# Patient Record
Sex: FEMALE | Race: WHITE | Employment: UNEMPLOYED | URBAN - NONMETROPOLITAN AREA
[De-identification: names, ages, dates, MRNs, and addresses within clinical notes are randomized per-mention and may not be internally consistent; named-entity substitution may affect disease eponyms.]

---

## 2023-02-01 ENCOUNTER — TELEPHONE (OUTPATIENT)
Dept: FAMILY MEDICINE CLINIC | Age: 48
End: 2023-02-01

## 2023-02-09 ENCOUNTER — OFFICE VISIT (OUTPATIENT)
Dept: FAMILY MEDICINE CLINIC | Age: 48
End: 2023-02-09
Payer: COMMERCIAL

## 2023-02-09 VITALS
BODY MASS INDEX: 27.49 KG/M2 | WEIGHT: 165 LBS | SYSTOLIC BLOOD PRESSURE: 136 MMHG | HEIGHT: 65 IN | RESPIRATION RATE: 20 BRPM | HEART RATE: 86 BPM | DIASTOLIC BLOOD PRESSURE: 84 MMHG

## 2023-02-09 DIAGNOSIS — Z13.220 SCREENING FOR HYPERLIPIDEMIA: ICD-10-CM

## 2023-02-09 DIAGNOSIS — N30.90 CYSTITIS: ICD-10-CM

## 2023-02-09 DIAGNOSIS — Z91.018 FOOD ALLERGY: ICD-10-CM

## 2023-02-09 DIAGNOSIS — L98.9 SKIN ABNORMALITIES: ICD-10-CM

## 2023-02-09 DIAGNOSIS — M54.40 BACK PAIN OF LUMBAR REGION WITH SCIATICA: ICD-10-CM

## 2023-02-09 DIAGNOSIS — F41.9 ANXIETY: Primary | ICD-10-CM

## 2023-02-09 DIAGNOSIS — R35.0 URINARY FREQUENCY: ICD-10-CM

## 2023-02-09 LAB
BILIRUBIN, POC: NEGATIVE
BLOOD URINE, POC: ABNORMAL
CLARITY, POC: CLEAR
COLOR, POC: YELLOW
GLUCOSE URINE, POC: NEGATIVE
KETONES, POC: NEGATIVE
LEUKOCYTE EST, POC: NEGATIVE
NITRITE, POC: NEGATIVE
PH, POC: 5.5
PROTEIN, POC: NEGATIVE
SPECIFIC GRAVITY, POC: 1.01
UROBILINOGEN, POC: 0.2

## 2023-02-09 PROCEDURE — 99204 OFFICE O/P NEW MOD 45 MIN: CPT | Performed by: NURSE PRACTITIONER

## 2023-02-09 PROCEDURE — 81003 URINALYSIS AUTO W/O SCOPE: CPT | Performed by: NURSE PRACTITIONER

## 2023-02-09 RX ORDER — NITROFURANTOIN 25; 75 MG/1; MG/1
100 CAPSULE ORAL 2 TIMES DAILY
Qty: 10 CAPSULE | Refills: 0 | Status: SHIPPED | OUTPATIENT
Start: 2023-02-09 | End: 2023-02-14

## 2023-02-09 RX ORDER — BUPROPION HYDROCHLORIDE 150 MG/1
TABLET ORAL
Qty: 53 TABLET | Refills: 0 | Status: SHIPPED | OUTPATIENT
Start: 2023-02-09 | End: 2023-03-11

## 2023-02-09 SDOH — ECONOMIC STABILITY: FOOD INSECURITY: WITHIN THE PAST 12 MONTHS, THE FOOD YOU BOUGHT JUST DIDN'T LAST AND YOU DIDN'T HAVE MONEY TO GET MORE.: NEVER TRUE

## 2023-02-09 SDOH — ECONOMIC STABILITY: INCOME INSECURITY: HOW HARD IS IT FOR YOU TO PAY FOR THE VERY BASICS LIKE FOOD, HOUSING, MEDICAL CARE, AND HEATING?: NOT HARD AT ALL

## 2023-02-09 SDOH — ECONOMIC STABILITY: FOOD INSECURITY: WITHIN THE PAST 12 MONTHS, YOU WORRIED THAT YOUR FOOD WOULD RUN OUT BEFORE YOU GOT MONEY TO BUY MORE.: NEVER TRUE

## 2023-02-09 SDOH — ECONOMIC STABILITY: HOUSING INSECURITY
IN THE LAST 12 MONTHS, WAS THERE A TIME WHEN YOU DID NOT HAVE A STEADY PLACE TO SLEEP OR SLEPT IN A SHELTER (INCLUDING NOW)?: NO

## 2023-02-09 ASSESSMENT — ANXIETY QUESTIONNAIRES
1. FEELING NERVOUS, ANXIOUS, OR ON EDGE: 2
5. BEING SO RESTLESS THAT IT IS HARD TO SIT STILL: 3-NEARLY EVERY DAY
7. FEELING AFRAID AS IF SOMETHING AWFUL MIGHT HAPPEN: 1-SEVERAL DAYS
3. WORRYING TOO MUCH ABOUT DIFFERENT THINGS: 3-NEARLY EVERY DAY
GAD7 TOTAL SCORE: 16
2. NOT BEING ABLE TO STOP OR CONTROL WORRYING: 1-SEVERAL DAYS
6. BECOMING EASILY ANNOYED OR IRRITABLE: 3-NEARLY EVERY DAY
4. TROUBLE RELAXING: 3-NEARLY EVERY DAY

## 2023-02-09 ASSESSMENT — ENCOUNTER SYMPTOMS
ROS SKIN COMMENTS: LESIONS
BACK PAIN: 1

## 2023-02-09 ASSESSMENT — PATIENT HEALTH QUESTIONNAIRE - PHQ9
SUM OF ALL RESPONSES TO PHQ9 QUESTIONS 1 & 2: 0
SUM OF ALL RESPONSES TO PHQ QUESTIONS 1-9: 0
1. LITTLE INTEREST OR PLEASURE IN DOING THINGS: 0
2. FEELING DOWN, DEPRESSED OR HOPELESS: 0
SUM OF ALL RESPONSES TO PHQ QUESTIONS 1-9: 0

## 2023-02-09 NOTE — PROGRESS NOTES
[facility] PROFESSIONAL SERVS  [facility]  [address] [place] [address]  Dept: [phone]  Dept Fax: [phone]: [phone]     Visit Date:  2/9/2023    Patient:  Fabian Gallagher  YOB: 1975  Age: 41 y.o. Gender: female  BMI: Body mass index is 27.88 kg/m². Fabian Gallagher, New patient, is being seen today for   Chief Complaint   Patient presents with    Established New Doctor     Spot on right arm and back, right leg pain, urinary frequency    . Assessment/Plan      1. Anxiety  - Chronic, uncontrolled  - Start Wellbutrin for uncontrolled anxiety  - Monitor for medication SE  - Initiation of counseling services encouraged  - Further evaluate with blood work  - CBC with Auto Differential; Future  - Comprehensive Metabolic Panel; Future  - buPROPion (WELLBUTRIN XL) 150 MG extended release tablet; Take 1 tablet by mouth every morning for 7 days, THEN 2 tablets every morning for 23 days. Dispense: 53 tablet; Refill: 0  - TSH with Reflex; Future  - Vitamin D 25 Hydroxy; Future    2. Back pain of lumbar region with sciatica  Chronic  - Leg pain likely related to low back pain  - Further evaluate with xray  - May benefit from PT. Consider referral following xray completion  - NSAIDs or tylenol with meals as needed for pain  - XR LUMBAR SPINE (2-3 VIEWS); Future    3. Cystitis  - Acute  - Start Mery Heredia [address] for suspected cystitis  - Encouraged patient to increase fluid intake and to avoid carbonated drinks  - Urine culture for antibiotic susceptibility testing  - nitrofurantoin, macrocrystal-monohydrate, (MACROBID) 100 MG capsule; Take 1 capsule by mouth 2 times daily for 5 days  Dispense: 10 capsule; Refill: 0  - Culture, Urine    4. Skin abnormalities  - [facility] - [facility] Quint[facility]    5. Food allergy  - [employer]    6. Screening for hyperlipidemia  - Lipid Panel; Future    7.  Urinary frequency  - POCT Urinalysis No Micro (Auto)      Return in about 1 month (around 3/9/2023) for Anxiety. HPI:     Patient presents today with her  for a new patient appointment. No recent pcp. Health maintenance reviewed. Medical history significant for anxiety and adhd. No current specialists. Current concerns include urinary frequency, skin lesions and right knee pain. Right leg pain. Intermittent. 1x per month. Started 2 years ago. Slightly improved over the last couple of years (less frequent). Hurts to bend. Aching pain. Entire right leg. Hx of low back pain. Pain at its worst is a 6/10. Currently a 0/10. No exacerbating activities. Worse at night. Takes ibuprofen for pain with minimal improvement in symptoms. No swelling. Hx of right knee dislocation 15+ years ago. Sensation of energy fluctuations, hot flashes, sweating, anxiety, heart racing, sudden doom. Have been occurring for 10+ years. Occurs randomly. Present in clusters. LMP was within the last 2 weeks. Anxiety is uncontrolled. Previously treated with Wellbutrin. Patient tearful discussing symptoms. DAYDAY-7 sore is 16. Reports presence of urinary frequency and urgency. Denies flank pain and gross hematuria. Started 1 week ago. PHQ Scores 2/9/2023   PHQ2 Score 0   PHQ9 Score 0     Interpretation of Total Score Depression Severity: 1-4 = Minimal depression, 5-9 = Mild depression, 10-14 = Moderate depression, 15-19 = Moderately severe depression, 20-27 = Severe depression    DAYDAY 7 SCORE 2/9/2023   DAYDAY-7 Total Score 16     Interpretation of DAYDAY-7 score: 5-9 = mild anxiety, 10-14 = moderate anxiety, 15+ = severe anxiety. Recommend referral to behavioral health for scores 10 or greater.     Results for POC orders placed in visit on 02/09/23   POCT Urinalysis No Micro (Auto)   Result Value Ref Range    Color, UA YELLOW     Clarity, UA CLEAR     Glucose, UA POC NEGATIVE     Bilirubin, UA NEGATIVE     Ketones, UA NEGATIVE     Spec Grav, UA 1.010 Blood, UA POC SMALL (A)     pH, UA 5.5     Protein, UA POC NEGATIVE     Urobilinogen, UA 0.2     Leukocytes, UA NEGATIVE     Nitrite, UA NEGATIVE        Medications    Current Outpatient Medications:     Multiple Vitamin (MULTI-VITAMINS PO), Take by mouth, Disp: , Rfl:     buPROPion (WELLBUTRIN XL) 150 MG extended release tablet, Take 1 tablet by mouth every morning for 7 days, THEN 2 tablets every morning for 23 days. , Disp: 53 tablet, Rfl: 0    nitrofurantoin, macrocrystal-monohydrate, (MACROBID) 100 MG capsule, Take 1 capsule by mouth 2 times daily for 5 days, Disp: 10 capsule, Rfl: 0    The patient has No Known Allergies. Past Medical History  Jesus Saldaña  has no past medical history on file. Past Surgical History  The patient  has a past surgical history that includes  section. Family History  This patient's family history includes Colon Cancer in her maternal grandmother; Heart Disease in her maternal grandmother. Social History  Jesus Saldaña  reports that she has been smoking cigarettes. She has been smoking an average of .5 packs per day. She has never used smokeless tobacco. She reports that she does not currently use alcohol after a past usage of about 9.0 standard drinks per week. She reports current drug use. Frequency: 7.00 times per week. Drug: Marijuana Rich Hover). Health Maintenance:    Health maintenance reviewed.    Health Maintenance   Topic Date Due    COVID-19 Vaccine (1) Never done    Depression Screen  Never done    HIV screen  Never done    Hepatitis C screen  Never done    DTaP/Tdap/Td vaccine (1 - Tdap) Never done    Cervical cancer screen  Never done    Diabetes screen  Never done    Lipids  Never done    Flu vaccine (1) Never done    Colorectal Cancer Screen  Never done    Hepatitis A vaccine  Aged Out    Hib vaccine  Aged Out    Meningococcal (ACWY) vaccine  Aged Out    Pneumococcal 0-64 years Vaccine  Aged Out       Subjective/Objective:      Review of Systems   Constitutional: Negative for fever. Genitourinary:  Positive for difficulty urinating, frequency and urgency. Negative for dysuria, flank pain and hematuria. Musculoskeletal:  Positive for back pain. Negative for gait problem and myalgias. Skin:         Lesions   Psychiatric/Behavioral:  Positive for decreased concentration. Negative for dysphoric mood. The patient is nervous/anxious. /84   Pulse 86   Resp 20   Ht 5' 4.5\" (1.638 m)   Wt 165 lb (74.8 kg)   LMP 01/31/2023   BMI 27.88 kg/m²     Physical Exam  Vitals and nursing note reviewed. Constitutional:       General: She is awake. Appearance: Normal appearance. HENT:      Head: Normocephalic and atraumatic. Right Ear: Hearing and external ear normal.      Left Ear: Hearing and external ear normal.      Nose: Nose normal. No congestion or rhinorrhea. Eyes:      General: Lids are normal.         Right eye: No discharge. Left eye: No discharge. Conjunctiva/sclera: Conjunctivae normal.   Neck:      Trachea: No tracheal deviation. Cardiovascular:      Rate and Rhythm: Normal rate and regular rhythm. Heart sounds: Normal heart sounds. No murmur heard. Pulmonary:      Effort: Pulmonary effort is normal. No respiratory distress. Breath sounds: No stridor. No wheezing. Musculoskeletal:        Arms:       Cervical back: Full passive range of motion without pain. Skin:     General: Skin is dry. Coloration: Skin is not jaundiced or pale. Neurological:      General: No focal deficit present. Mental Status: She is alert. Mental status is at baseline. Psychiatric:         Mood and Affect: Affect normal. Mood is anxious. Behavior: Behavior is cooperative.          Labs Reviewed 2/9/2023:    No results found for: WBC, HGB, HCT, PLT, CHOL, TRIG, HDL, LDLDIRECT, ALT, AST, NA, K, CL, CREATININE, BUN, CO2, TSH, PSA, INR, GLUF, LABA1C, LABMICR        Patient given educational materials - see patient instructions. Discussed use, benefit, and side effects of prescribed medications. All patient questions answered. Pt voiced understanding. Reviewed health maintenance.        Electronically signed by NETO Bonilla CNP on 2/9/2023 at 3:48 PM EST

## 2023-02-11 LAB
BACTERIA UR CULT: ABNORMAL
ORGANISM: ABNORMAL

## 2023-02-13 ENCOUNTER — TELEPHONE (OUTPATIENT)
Dept: FAMILY MEDICINE CLINIC | Age: 48
End: 2023-02-13

## 2023-02-13 DIAGNOSIS — F41.9 ANXIETY: Primary | ICD-10-CM

## 2023-02-13 RX ORDER — BUPROPION HYDROCHLORIDE 300 MG/1
300 TABLET ORAL EVERY MORNING
Qty: 30 TABLET | Refills: 0 | Status: SHIPPED | OUTPATIENT
Start: 2023-02-13

## 2023-02-13 NOTE — TELEPHONE ENCOUNTER
Stop the macrobid, I do not see any signs of infection in her u/a anyways? ? Should get the blood work that is ordered to rule out other causes. [facility]

## 2023-02-13 NOTE — TELEPHONE ENCOUNTER
Patient notified of results, she has not noticed any difference in her sxs, is still having the frequency and urgency. Is also asking if a rx for Wellbutrin 300 mg can be sent to [facility]. Insurance wont pay for 2 150's but will cover the 300. They gave her the 7  pills for the 150 mg x 1 week.

## 2023-02-20 ENCOUNTER — HOSPITAL ENCOUNTER (OUTPATIENT)
Age: 48
End: 2023-02-20

## 2023-02-20 ENCOUNTER — HOSPITAL ENCOUNTER (OUTPATIENT)
Age: 48
Discharge: HOME | End: 2023-02-20
Payer: COMMERCIAL

## 2023-02-20 ENCOUNTER — HOSPITAL ENCOUNTER (OUTPATIENT)
Dept: GENERAL RADIOLOGY | Age: 48
Discharge: HOME | End: 2023-02-20
Payer: COMMERCIAL

## 2023-02-20 DIAGNOSIS — M54.40 BACK PAIN OF LUMBAR REGION WITH SCIATICA: ICD-10-CM

## 2023-02-20 PROCEDURE — 72100 X-RAY EXAM L-S SPINE 2/3 VWS: CPT

## 2023-02-22 ENCOUNTER — TELEPHONE (OUTPATIENT)
Dept: FAMILY MEDICINE CLINIC | Age: 48
End: 2023-02-22

## 2023-02-22 NOTE — TELEPHONE ENCOUNTER
----- Message from Arnoldo Jarquin MD sent at 2/22/2023 10:49 AM EST -----  Back films are ok. Incidental finding of constipation.

## 2025-07-21 ENCOUNTER — HOSPITAL ENCOUNTER (INPATIENT)
Age: 50
LOS: 1 days | Discharge: HOME | DRG: 053 | End: 2025-07-24
Payer: COMMERCIAL

## 2025-07-21 DIAGNOSIS — R56.9 SEIZURE-LIKE ACTIVITY (HCC): Primary | ICD-10-CM

## 2025-07-21 LAB
ANION GAP SERPL CALC-SCNC: 14 MEQ/L (ref 8–16)
BASOPHILS ABSOLUTE: 0 THOU/MM3 (ref 0–0.1)
BASOPHILS NFR BLD AUTO: 0.6 %
BUN SERPL-MCNC: 6 MG/DL (ref 8–23)
CALCIUM SERPL-MCNC: 8.5 MG/DL (ref 8.6–10)
CHLORIDE SERPL-SCNC: 100 MEQ/L (ref 98–111)
CK SERPL-CCNC: 633 U/L (ref 26–192)
CO2 SERPL-SCNC: 22 MEQ/L (ref 22–29)
CREAT SERPL-MCNC: 0.5 MG/DL (ref 0.5–0.9)
DEPRECATED RDW RBC AUTO: 45.6 FL (ref 35–45)
EOSINOPHIL NFR BLD AUTO: 0.4 %
EOSINOPHILS ABSOLUTE: 0 THOU/MM3 (ref 0–0.4)
ERYTHROCYTE [DISTWIDTH] IN BLOOD BY AUTOMATED COUNT: 13.2 % (ref 11.5–14.5)
ETHANOL SERPL-MCNC: < 0.01 % (ref 0–0.08)
GFR SERPL CREATININE-BSD FRML MDRD: > 90 ML/MIN/1.73M2
GLUCOSE SERPL-MCNC: 99 MG/DL (ref 74–109)
HCT VFR BLD AUTO: 39.3 % (ref 37–47)
HGB BLD-MCNC: 13.7 GM/DL (ref 12–16)
IMM GRANULOCYTES # BLD AUTO: 0.02 THOU/MM3 (ref 0–0.07)
IMM GRANULOCYTES NFR BLD AUTO: 0.3 %
LYMPHOCYTES ABSOLUTE: 1.7 THOU/MM3 (ref 1–4.8)
LYMPHOCYTES NFR BLD AUTO: 21.3 %
MCH RBC QN AUTO: 32.9 PG (ref 26–33)
MCHC RBC AUTO-ENTMCNC: 34.9 GM/DL (ref 32.2–35.5)
MCV RBC AUTO: 94.2 FL (ref 81–99)
MONOCYTES ABSOLUTE: 0.7 THOU/MM3 (ref 0.4–1.3)
MONOCYTES NFR BLD AUTO: 8.5 %
NEUTROPHILS ABSOLUTE: 5.4 THOU/MM3 (ref 1.8–7.7)
NEUTROPHILS NFR BLD AUTO: 68.9 %
NRBC BLD AUTO-RTO: 0 /100 WBC
PLATELET # BLD AUTO: 247 THOU/MM3 (ref 130–400)
PMV BLD AUTO: 9.7 FL (ref 9.4–12.4)
POTASSIUM SERPL-SCNC: 3.1 MEQ/L (ref 3.5–5.2)
RBC # BLD AUTO: 4.17 MILL/MM3 (ref 4.2–5.4)
SODIUM SERPL-SCNC: 136 MEQ/L (ref 135–145)
WBC # BLD AUTO: 7.9 THOU/MM3 (ref 4.8–10.8)

## 2025-07-21 PROCEDURE — G0379 DIRECT REFER HOSPITAL OBSERV: HCPCS

## 2025-07-21 PROCEDURE — 82077 ASSAY SPEC XCP UR&BREATH IA: CPT

## 2025-07-21 PROCEDURE — 2500000003 HC RX 250 WO HCPCS

## 2025-07-21 PROCEDURE — 36415 COLL VENOUS BLD VENIPUNCTURE: CPT

## 2025-07-21 PROCEDURE — 83735 ASSAY OF MAGNESIUM: CPT

## 2025-07-21 PROCEDURE — 2580000003 HC RX 258

## 2025-07-21 PROCEDURE — 80048 BASIC METABOLIC PNL TOTAL CA: CPT

## 2025-07-21 PROCEDURE — G0378 HOSPITAL OBSERVATION PER HR: HCPCS

## 2025-07-21 PROCEDURE — 85025 COMPLETE CBC W/AUTO DIFF WBC: CPT

## 2025-07-21 PROCEDURE — 82550 ASSAY OF CK (CPK): CPT

## 2025-07-21 RX ORDER — POLYETHYLENE GLYCOL 3350 17 G/17G
17 POWDER, FOR SOLUTION ORAL DAILY PRN
Status: DISCONTINUED | OUTPATIENT
Start: 2025-07-21 | End: 2025-07-24 | Stop reason: HOSPADM

## 2025-07-21 RX ORDER — SODIUM CHLORIDE 9 MG/ML
INJECTION, SOLUTION INTRAVENOUS PRN
Status: DISCONTINUED | OUTPATIENT
Start: 2025-07-21 | End: 2025-07-24 | Stop reason: HOSPADM

## 2025-07-21 RX ORDER — MAGNESIUM SULFATE IN WATER 40 MG/ML
2000 INJECTION, SOLUTION INTRAVENOUS PRN
Status: DISCONTINUED | OUTPATIENT
Start: 2025-07-21 | End: 2025-07-24 | Stop reason: HOSPADM

## 2025-07-21 RX ORDER — ENOXAPARIN SODIUM 100 MG/ML
40 INJECTION SUBCUTANEOUS DAILY
Status: DISCONTINUED | OUTPATIENT
Start: 2025-07-22 | End: 2025-07-24 | Stop reason: HOSPADM

## 2025-07-21 RX ORDER — POTASSIUM CHLORIDE 1500 MG/1
40 TABLET, EXTENDED RELEASE ORAL PRN
Status: DISCONTINUED | OUTPATIENT
Start: 2025-07-21 | End: 2025-07-24 | Stop reason: HOSPADM

## 2025-07-21 RX ORDER — SODIUM CHLORIDE 0.9 % (FLUSH) 0.9 %
5-40 SYRINGE (ML) INJECTION PRN
Status: DISCONTINUED | OUTPATIENT
Start: 2025-07-21 | End: 2025-07-24 | Stop reason: HOSPADM

## 2025-07-21 RX ORDER — SODIUM CHLORIDE 9 MG/ML
INJECTION, SOLUTION INTRAVENOUS CONTINUOUS
Status: DISCONTINUED | OUTPATIENT
Start: 2025-07-21 | End: 2025-07-22

## 2025-07-21 RX ORDER — LEVETIRACETAM 500 MG/1
500 TABLET ORAL 2 TIMES DAILY
Status: DISCONTINUED | OUTPATIENT
Start: 2025-07-22 | End: 2025-07-22

## 2025-07-21 RX ORDER — ACETAMINOPHEN 650 MG/1
650 SUPPOSITORY RECTAL EVERY 6 HOURS PRN
Status: DISCONTINUED | OUTPATIENT
Start: 2025-07-21 | End: 2025-07-24 | Stop reason: HOSPADM

## 2025-07-21 RX ORDER — ONDANSETRON 2 MG/ML
4 INJECTION INTRAMUSCULAR; INTRAVENOUS EVERY 6 HOURS PRN
Status: DISCONTINUED | OUTPATIENT
Start: 2025-07-21 | End: 2025-07-24 | Stop reason: HOSPADM

## 2025-07-21 RX ORDER — POTASSIUM CHLORIDE 7.45 MG/ML
10 INJECTION INTRAVENOUS PRN
Status: DISCONTINUED | OUTPATIENT
Start: 2025-07-21 | End: 2025-07-24 | Stop reason: HOSPADM

## 2025-07-21 RX ORDER — ONDANSETRON 4 MG/1
4 TABLET, ORALLY DISINTEGRATING ORAL EVERY 8 HOURS PRN
Status: DISCONTINUED | OUTPATIENT
Start: 2025-07-21 | End: 2025-07-24 | Stop reason: HOSPADM

## 2025-07-21 RX ORDER — SODIUM CHLORIDE 0.9 % (FLUSH) 0.9 %
5-40 SYRINGE (ML) INJECTION EVERY 12 HOURS SCHEDULED
Status: DISCONTINUED | OUTPATIENT
Start: 2025-07-21 | End: 2025-07-24 | Stop reason: HOSPADM

## 2025-07-21 RX ORDER — ACETAMINOPHEN 325 MG/1
650 TABLET ORAL EVERY 6 HOURS PRN
Status: DISCONTINUED | OUTPATIENT
Start: 2025-07-21 | End: 2025-07-24 | Stop reason: HOSPADM

## 2025-07-21 RX ADMIN — SODIUM CHLORIDE: 0.9 INJECTION, SOLUTION INTRAVENOUS at 23:37

## 2025-07-21 RX ADMIN — SODIUM CHLORIDE, PRESERVATIVE FREE 10 ML: 5 INJECTION INTRAVENOUS at 23:38

## 2025-07-21 NOTE — PROGRESS NOTES
Jackeline Escobar is a 49 y.o. female from [facility] with Yin Pennington NP requesting transfer. Jackeline has a PMHx of anxiety. It is reported that this morning, the significant other of Jackeline was trying to awaken her and he reported that she began to shake, he described her to be in a decorticate-like position, and \"foaming at the mouth\". At that point he called EMS, who came and evaluated her, but Jackeline refused to be taken to the hospital at that time. Jackeline then arrived to [facility] ED this afternoon with c/o diarrhea for the past 2 days as well as the \"seizure-like event\" that happened earlier. Workup revealed , otherwise unremarkable labs, including a normal troponin. Head CT (-) acute findings. Abdominal imaging reported as showing inflammation in the small bowel consistent with gastritis. She received IV Keppra. Neurology, Dr Gil, conferenced with Yin Pennington NP and recommended that patient be admitted for observation due to this being her first seizure. Last recorded VS: /81, HR 70, RR 20, SpO2 95% RA, temperature 97.9. Accepted in transfer under JOSE ANTONIO Rivera/Dr Santiago as an observation, to a medical level of care with telemetry.

## 2025-07-22 PROBLEM — R56.9 SEIZURE-LIKE ACTIVITY (HCC): Status: ACTIVE | Noted: 2025-07-22

## 2025-07-22 LAB
AMPHETAMINES UR QL SCN: NEGATIVE
ANION GAP SERPL CALC-SCNC: 11 MEQ/L (ref 8–16)
BACTERIA URNS QL MICRO: ABNORMAL /HPF
BARBITURATES UR QL SCN: NEGATIVE
BASOPHILS ABSOLUTE: 0.1 THOU/MM3 (ref 0–0.1)
BASOPHILS NFR BLD AUTO: 0.7 %
BENZODIAZ UR QL SCN: POSITIVE
BILIRUB UR QL STRIP.AUTO: NEGATIVE
BUN SERPL-MCNC: 6 MG/DL (ref 8–23)
BUPRENORPHINE URINE: NEGATIVE
BZE UR QL SCN: NEGATIVE
CA-I BLD ISE-SCNC: 1.08 MMOL/L (ref 1.12–1.32)
CALCIUM SERPL-MCNC: 8.4 MG/DL (ref 8.6–10)
CANNABINOIDS UR QL SCN: POSITIVE
CASTS #/AREA URNS LPF: ABNORMAL /LPF
CASTS 2: ABNORMAL /LPF
CHARACTER UR: CLEAR
CHLORIDE SERPL-SCNC: 102 MEQ/L (ref 98–111)
CK SERPL-CCNC: 563 U/L (ref 26–192)
CO2 SERPL-SCNC: 26 MEQ/L (ref 22–29)
COLOR, UA: YELLOW
CREAT SERPL-MCNC: 0.6 MG/DL (ref 0.5–0.9)
CRYSTALS URNS MICRO: ABNORMAL
DEPRECATED RDW RBC AUTO: 45.4 FL (ref 35–45)
EOSINOPHIL NFR BLD AUTO: 0.4 %
EOSINOPHILS ABSOLUTE: 0 THOU/MM3 (ref 0–0.4)
EPITHELIAL CELLS, UA: ABNORMAL /HPF
ERYTHROCYTE [DISTWIDTH] IN BLOOD BY AUTOMATED COUNT: 13.2 % (ref 11.5–14.5)
FENTANYL: NEGATIVE
GFR SERPL CREATININE-BSD FRML MDRD: > 90 ML/MIN/1.73M2
GLUCOSE SERPL-MCNC: 91 MG/DL (ref 74–109)
GLUCOSE UR QL STRIP.AUTO: NEGATIVE MG/DL
HCG UR QL: NEGATIVE
HCT VFR BLD AUTO: 40 % (ref 37–47)
HGB BLD-MCNC: 13.7 GM/DL (ref 12–16)
HGB UR QL STRIP.AUTO: ABNORMAL
IMM GRANULOCYTES # BLD AUTO: 0.03 THOU/MM3 (ref 0–0.07)
IMM GRANULOCYTES NFR BLD AUTO: 0.3 %
KETONES UR QL STRIP.AUTO: 15
LYMPHOCYTES ABSOLUTE: 2.6 THOU/MM3 (ref 1–4.8)
LYMPHOCYTES NFR BLD AUTO: 28.7 %
MAGNESIUM SERPL-MCNC: 2 MG/DL (ref 1.6–2.6)
MAGNESIUM SERPL-MCNC: 2 MG/DL (ref 1.6–2.6)
MCH RBC QN AUTO: 32.3 PG (ref 26–33)
MCHC RBC AUTO-ENTMCNC: 34.3 GM/DL (ref 32.2–35.5)
MCV RBC AUTO: 94.3 FL (ref 81–99)
MISCELLANEOUS 2: ABNORMAL
MONOCYTES ABSOLUTE: 0.7 THOU/MM3 (ref 0.4–1.3)
MONOCYTES NFR BLD AUTO: 8 %
NEUTROPHILS ABSOLUTE: 5.6 THOU/MM3 (ref 1.8–7.7)
NEUTROPHILS NFR BLD AUTO: 61.9 %
NITRITE UR QL STRIP: NEGATIVE
NRBC BLD AUTO-RTO: 0 /100 WBC
OPIATES UR QL SCN: NEGATIVE
OXYCODONE: NEGATIVE
PCP UR QL SCN: NEGATIVE
PH UR STRIP.AUTO: 7 [PH] (ref 5–9)
PLATELET # BLD AUTO: 244 THOU/MM3 (ref 130–400)
PMV BLD AUTO: 9.6 FL (ref 9.4–12.4)
POTASSIUM SERPL-SCNC: 3.5 MEQ/L (ref 3.5–5.2)
PROT UR STRIP.AUTO-MCNC: NEGATIVE MG/DL
RBC # BLD AUTO: 4.24 MILL/MM3 (ref 4.2–5.4)
RBC URINE: ABNORMAL /HPF
RENAL EPI CELLS #/AREA URNS HPF: ABNORMAL /[HPF]
SODIUM SERPL-SCNC: 139 MEQ/L (ref 135–145)
SP GR UR REFRACT.AUTO: 1.01 (ref 1–1.03)
UROBILINOGEN, URINE: 1 EU/DL (ref 0–1)
WBC # BLD AUTO: 9 THOU/MM3 (ref 4.8–10.8)
WBC #/AREA URNS HPF: ABNORMAL /HPF
WBC #/AREA URNS HPF: NEGATIVE /[HPF]
YEAST LIKE FUNGI URNS QL MICRO: ABNORMAL

## 2025-07-22 PROCEDURE — 81001 URINALYSIS AUTO W/SCOPE: CPT

## 2025-07-22 PROCEDURE — 99223 1ST HOSP IP/OBS HIGH 75: CPT | Performed by: INTERNAL MEDICINE

## 2025-07-22 PROCEDURE — 80048 BASIC METABOLIC PNL TOTAL CA: CPT

## 2025-07-22 PROCEDURE — 2580000003 HC RX 258

## 2025-07-22 PROCEDURE — 6360000002 HC RX W HCPCS

## 2025-07-22 PROCEDURE — 85025 COMPLETE CBC W/AUTO DIFF WBC: CPT

## 2025-07-22 PROCEDURE — 82550 ASSAY OF CK (CPK): CPT

## 2025-07-22 PROCEDURE — 2500000003 HC RX 250 WO HCPCS

## 2025-07-22 PROCEDURE — 36415 COLL VENOUS BLD VENIPUNCTURE: CPT

## 2025-07-22 PROCEDURE — G0378 HOSPITAL OBSERVATION PER HR: HCPCS

## 2025-07-22 PROCEDURE — 83735 ASSAY OF MAGNESIUM: CPT

## 2025-07-22 PROCEDURE — 80307 DRUG TEST PRSMV CHEM ANLYZR: CPT

## 2025-07-22 PROCEDURE — 6370000000 HC RX 637 (ALT 250 FOR IP)

## 2025-07-22 PROCEDURE — 82330 ASSAY OF CALCIUM: CPT

## 2025-07-22 PROCEDURE — 81025 URINE PREGNANCY TEST: CPT

## 2025-07-22 PROCEDURE — 95819 EEG AWAKE AND ASLEEP: CPT

## 2025-07-22 RX ORDER — CALCIUM CARBONATE 500 MG/1
500 TABLET, CHEWABLE ORAL 3 TIMES DAILY PRN
Status: DISCONTINUED | OUTPATIENT
Start: 2025-07-22 | End: 2025-07-24 | Stop reason: HOSPADM

## 2025-07-22 RX ORDER — POTASSIUM CHLORIDE 1500 MG/1
40 TABLET, EXTENDED RELEASE ORAL ONCE
Status: COMPLETED | OUTPATIENT
Start: 2025-07-22 | End: 2025-07-22

## 2025-07-22 RX ORDER — LORAZEPAM 1 MG/1
1 TABLET ORAL ONCE
Status: COMPLETED | OUTPATIENT
Start: 2025-07-22 | End: 2025-07-23

## 2025-07-22 RX ADMIN — SODIUM CHLORIDE: 0.9 INJECTION, SOLUTION INTRAVENOUS at 10:18

## 2025-07-22 RX ADMIN — ONDANSETRON 4 MG: 2 INJECTION, SOLUTION INTRAMUSCULAR; INTRAVENOUS at 09:03

## 2025-07-22 RX ADMIN — ENOXAPARIN SODIUM 40 MG: 100 INJECTION SUBCUTANEOUS at 09:03

## 2025-07-22 RX ADMIN — LEVETIRACETAM 500 MG: 500 TABLET, FILM COATED ORAL at 09:03

## 2025-07-22 RX ADMIN — POTASSIUM CHLORIDE 40 MEQ: 1500 TABLET, EXTENDED RELEASE ORAL at 01:43

## 2025-07-22 RX ADMIN — SODIUM CHLORIDE, PRESERVATIVE FREE 10 ML: 5 INJECTION INTRAVENOUS at 20:26

## 2025-07-22 ASSESSMENT — ENCOUNTER SYMPTOMS
RHINORRHEA: 0
ABDOMINAL PAIN: 1
DIARRHEA: 1
SHORTNESS OF BREATH: 0
COUGH: 0
VOMITING: 0
NAUSEA: 1
SORE THROAT: 0

## 2025-07-22 ASSESSMENT — VISUAL ACUITY: VA_NORMAL: 1

## 2025-07-22 NOTE — CONSULTS
Neurology Consult Note    Date:7/22/2025       Room:[facility]15/015-A  Patient Name:Jackeline Escobar     YOB: 1975     Age:49 y.o.    Requesting Physician: Fredrick Edmond MD     Reason for Consult:  Evaluate for ?? Of seizure      Chief Complaint: No chief complaint on file.      Subjective     Jackeline Escobar is a 49 y.o. female with a history of anxiety who presented to [facility] as a transfer from [facility] for further evaluation of seizure like activity. Patient and significant other at bedside states that the night prior patient had drank roughly a bottle of wine. Patient notes that for the last 3-4 days she has been with diarrhea and not being able to keep anything down. Significant other reports that patient was lying down and had a sudden gasp of air then her extremities became rigid and patient begin to seize for roughly 3 minutes. He states at that time patient was also foaming at the mouth. He states that after patient began to have sonorous respirations and was confused for roughly 10 minutes afterwards. Patient was brought to [facility] ER for further evaluation. Abdominal imaging obtained at [facility] shows gastritis. Patient was given Keppra and transferred to [facility] for neurology consult. At time of evaluation, patient states she is doing relatively well. She denies any history of previous episode and denies having a seizure before. No tongue laceration noted on examination. Patient denies losing bowel or bladder function during episode. Ionized calcium noted to be low at 1.08. Urine drug screen positive for cannabinoids. MRI brain W WO contrast pending. EEG pending.     Review of Systems   Review of Systems   Constitutional:  Negative for chills and fever.   HENT:  Negative for rhinorrhea and sore throat.    Eyes:  Negative for visual disturbance.   Respiratory:  Negative for cough and shortness of breath.    Cardiovascular:  Negative for chest pain.   Gastrointestinal:  Positive for abdominal pain,

## 2025-07-22 NOTE — CARE COORDINATION
Case Management Assessment Initial Evaluation    Date/Time of Evaluation: 2025 8:51 AM  Assessment Completed by: Sheila Cornell    If patient is discharged prior to next notation, then this note serves as note for discharge by case management.    Patient Name: Jackeline Escobar                   YOB: 1975  Diagnosis: Seizure-like activity (HCC) [R56.9]                   Date / Time: 2025 10:07 PM  Location: [address] [place] [address]     Patient Admission Status: Observation   Readmission Risk Low 0-14, Mod 15-19), High > 20: No data recorded  Current PCP: Kody Bates, NETO - CNP  Health Care Decision Makers:     Additional Case Management Notes: Patient presents to ED with seizure-like activity. Hospitalist and Neuro following. IVF. PO Keppra.     Procedures:  EEG.     Imagin/22 MRI Brain:     Patient Goals/Plan/Treatment Preferences:  Met with Jackeline and her SO. She lives at home with SO and children. Patient denies any needs at this time. Patient is in the process of setting up new PCP appointment for [facility] in [place]. Provided new patient application to patient.        [phone]   Service Assessment   Patient Orientation Alert and Oriented   Cognition Alert   History Provided By Patient   Primary Caregiver Self   Accompanied By/Relationship Significant other   Support Systems Children;Spouse/Significant Other   Patient's Healthcare Decision Maker is: Legal Next of Kin   PCP Verified by CM Yes   Last Visit to PCP Within last two years   Prior Functional Level Independent in ADLs/IADLs   Current Functional Level Independent in ADLs/IADLs   Can patient return to prior living arrangement Yes   Ability to make needs known: Good   Family able to assist with home care needs: Yes   Would you like for me to discuss the discharge plan with any other family members/significant others, and if so, who? Yes  (Significant other at bedside)   Financial Resources Medicaid

## 2025-07-22 NOTE — H&P
History & Physical  Internal Medicine Resident         Patient: Jackeline Escobar 49 y.o. female      : 1975  Date of Admission: 2025  Date of Service: Pt seen/examined on 25 and Admitted to Observation with expected LOS less than two midnights due to medical therapy.       ASSESSMENT AND PLAN  Seizure like Activity: Pt's partner as witness reports patient abnormal posturing and shaking violently with foaming through her mouth concerning for seizure. Pt does not have any previous episodes similar. Pt was taken to the [facility] ED, where workup showed , CT head negative, All other labs negative including lactic acid and trops.Pt has history of binge drinking and previous night had 6 drinks. Pt has tried ketamine for her anxiety and recently stopped due to it being expensive.  Trend CK  Trend BMP  Start Keppra 500 mg Bid  F/U EEG  F/U UA  Seizure precautions in place  Consider neurology consult if pt has another seizure    Gastritis: Pt reports 2 days of diarrhea 6-7 episodes daily. CT A/P is concerning for jejunitis.  C/w supportive management including IVF    Chronic Conditions (reviewed and stable unless otherwise stated)   Anxiety: pt stopped taking her wellbutrin due to hallucinations. Pt started Ketamine treatments but stopped due to being expensive. Currently on no medication.   [place] Palsy: Pt reports bells palsy during her previous pregnancy, now resolved.       Data reviewed (unless otherwise discussed in assessment/plan)  EKG:  I have reviewed the EKG with the following interpretation: N/A  Imaging: I have reviewed CT Head and CT A/P with the following interpretation: CT Head negative, CT A/P positive for jejunitis  Labs: Reviewed, see chart and plan above.       =======================================================================    SUBJECTIVE    Chief Complaint:  Seizure like Activity    History Of Present Illness:  Jackeline Escobar is a 49 y.o. female with PMHx of Anxiety who

## 2025-07-22 NOTE — PROGRESS NOTES
[facility]  Neurodiagnostic Laboratory Technician Report  STRZ ONC MED 5K  Routine, Standard Test    Name: Jackeline Escobar  : 1975  Age: 49 y.o.  Sex: female  MRN: [id number]  CSN: [id number]    Ordering Provider:  Farhad Burrows      EEG Number: [id number]    Time In: Time In: 0804 (25)  Time Out: Time Out: 08 (2025)  Total Treatment Time: Minutes: 20          Clinical History: Hx of anxiety and binge drinking , bells palsy whilst pregnant. Patient had an episode of arm posturing, shaking and foaming at the mouth, witnessed by boyfriend.  CT of the head 2025  FINDINGS:   Acute: No acute intracranial hemorrhages, intracranial masses, acute infarctions, or remote infarctions are present. White: The white gray matter interfaces are normally maintained. Midline: The ventricles and midline structures appear normal. Cerebellum: The midbrain and cerebellum appear normal. Orbit: The orbital contents appear normal. Sinus: The sinuses appear normally pneumatized. Bone: The skull appears normal.     MRI of the brain -Pending     Past Medical History: No past medical history on file.    Medications:   Prior to Admission medications    Medication Sig Start Date End Date Taking? Authorizing Provider   buPROPion (WELLBUTRIN XL) 300 MG extended release tablet Take 1 tablet by mouth every morning 23  Yes Joe Gordon MD   Multiple Vitamin (MULTI-VITAMINS PO) Take by mouth   Yes ProviderKimi MD       Hand Dominance: Right   Sedation: No   H.V. Completed: Yes,with good effort   Photic: Yes   Sleep: Yes   Drowsy: Yes   Sleep Deprived: No   Seizures Observed: No   Mentality: alert     Technician: Rachel Farah 2025

## 2025-07-22 NOTE — PROGRESS NOTES
Hospitalist Progress Note  Internal Medicine Resident      Patient: Jackeline Escobar 49 y.o. female      Unit/Bed: -15/015-A    Admit Date: 7/21/2025      ASSESSMENT AND PLAN  Active Problems  Seizure-like activity: Patient's fiancé reports patient was posturing foaming at the mouth and shaking violently on the morning of 7/21 while sleeping.  Patient not report any previous seizure activity.  Patient was taken to [place] ED where workup showed , CT head negative, other labs unremarkable including lactic acid and troponin.  Patient reports binge drinking and reports marijuana use and drinking 1 1 bottle the night before.  Patient has tried ketamine for anxiety but has stopped due to it being too expensive  Trend , 633, 7/22 pending  Ethanol less than 0.01  On Keppra 500 mg twice daily  Follow-up EEG results pending  Follow-up MRI brain results pending  Urinalysis positive for cannabinoid and benzodiazepine  Seizure precautions in place  Consider neurology consult if another seizure  Gastritis: Patient reports 2 days of diarrhea with about 6-7 bouts of diarrhea daily.  CT A/P is concerning for jejunitis.  Continue 100 cc NS IVF  Will consider GI panel if diarrhea continues  Monitor for diarrhea    Chronic Conditions (reviewed and stable unless otherwise stated)  Anxiety: Patient stopped taking Wellbutrin due to hallucinations, started ketamine treatment but stopped due to being too expensive.  Currently on no medication  Bell's palsy: Patient reports Bell's palsy during previous pregnancy now resolved      LDA: []CVC / []PICC / []Midline / []Zuniga / []Drains / []Mediport / [x]None  Antibiotics: None  Steroids: None  Labs (still needed?): []Yes / [x]No  IVF (still needed?): []Yes / [x]No    Level of care: [x]Step Down / []Med-Surg  Bed Status: [x]Inpatient / []Observation  Telemetry: [x]Yes / []No  PT/OT: []Yes / [x]No    DVT Prophylaxis: [x] Lovenox / [] Heparin / [] SCDs / [] Already on Systemic

## 2025-07-23 ENCOUNTER — APPOINTMENT (OUTPATIENT)
Dept: MRI IMAGING | Age: 50
DRG: 053 | End: 2025-07-23
Payer: COMMERCIAL

## 2025-07-23 LAB
ANION GAP SERPL CALC-SCNC: 14 MEQ/L (ref 8–16)
BUN SERPL-MCNC: 9 MG/DL (ref 8–23)
CA-I BLD ISE-SCNC: 1.2 MMOL/L (ref 1.12–1.32)
CALCIUM SERPL-MCNC: 8.7 MG/DL (ref 8.6–10)
CHLORIDE SERPL-SCNC: 102 MEQ/L (ref 98–111)
CK SERPL-CCNC: 416 U/L (ref 26–192)
CO2 SERPL-SCNC: 23 MEQ/L (ref 22–29)
CREAT SERPL-MCNC: 0.6 MG/DL (ref 0.5–0.9)
DEPRECATED RDW RBC AUTO: 46.6 FL (ref 35–45)
ERYTHROCYTE [DISTWIDTH] IN BLOOD BY AUTOMATED COUNT: 13.3 % (ref 11.5–14.5)
GFR SERPL CREATININE-BSD FRML MDRD: > 90 ML/MIN/1.73M2
GLUCOSE BLD STRIP.AUTO-MCNC: 135 MG/DL (ref 70–108)
GLUCOSE SERPL-MCNC: 106 MG/DL (ref 74–109)
HCT VFR BLD AUTO: 35.8 % (ref 37–47)
HGB BLD-MCNC: 12 GM/DL (ref 12–16)
MCH RBC QN AUTO: 32 PG (ref 26–33)
MCHC RBC AUTO-ENTMCNC: 33.5 GM/DL (ref 32.2–35.5)
MCV RBC AUTO: 95.5 FL (ref 81–99)
PLATELET # BLD AUTO: 204 THOU/MM3 (ref 130–400)
PMV BLD AUTO: 10 FL (ref 9.4–12.4)
POTASSIUM SERPL-SCNC: 3.4 MEQ/L (ref 3.5–5.2)
RBC # BLD AUTO: 3.75 MILL/MM3 (ref 4.2–5.4)
SODIUM SERPL-SCNC: 139 MEQ/L (ref 135–145)
WBC # BLD AUTO: 6.2 THOU/MM3 (ref 4.8–10.8)

## 2025-07-23 PROCEDURE — 82550 ASSAY OF CK (CPK): CPT

## 2025-07-23 PROCEDURE — 95718 EEG PHYS/QHP 2-12 HR W/VEEG: CPT | Performed by: PSYCHIATRY & NEUROLOGY

## 2025-07-23 PROCEDURE — 95700 EEG CONT REC W/VID EEG TECH: CPT

## 2025-07-23 PROCEDURE — 95711 VEEG 2-12 HR UNMONITORED: CPT

## 2025-07-23 PROCEDURE — 36415 COLL VENOUS BLD VENIPUNCTURE: CPT

## 2025-07-23 PROCEDURE — 82948 REAGENT STRIP/BLOOD GLUCOSE: CPT

## 2025-07-23 PROCEDURE — G0378 HOSPITAL OBSERVATION PER HR: HCPCS

## 2025-07-23 PROCEDURE — 85027 COMPLETE CBC AUTOMATED: CPT

## 2025-07-23 PROCEDURE — 6370000000 HC RX 637 (ALT 250 FOR IP)

## 2025-07-23 PROCEDURE — 80048 BASIC METABOLIC PNL TOTAL CA: CPT

## 2025-07-23 PROCEDURE — A9579 GAD-BASE MR CONTRAST NOS,1ML: HCPCS

## 2025-07-23 PROCEDURE — 70553 MRI BRAIN STEM W/O & W/DYE: CPT

## 2025-07-23 PROCEDURE — 6360000002 HC RX W HCPCS

## 2025-07-23 PROCEDURE — 6360000004 HC RX CONTRAST MEDICATION

## 2025-07-23 PROCEDURE — 82330 ASSAY OF CALCIUM: CPT

## 2025-07-23 PROCEDURE — 2500000003 HC RX 250 WO HCPCS

## 2025-07-23 RX ORDER — GADOTERIDOL 279.3 MG/ML
15 INJECTION INTRAVENOUS
Status: COMPLETED | OUTPATIENT
Start: 2025-07-23 | End: 2025-07-23

## 2025-07-23 RX ADMIN — SODIUM CHLORIDE, PRESERVATIVE FREE 10 ML: 5 INJECTION INTRAVENOUS at 21:20

## 2025-07-23 RX ADMIN — ENOXAPARIN SODIUM 40 MG: 100 INJECTION SUBCUTANEOUS at 08:36

## 2025-07-23 RX ADMIN — LORAZEPAM 1 MG: 1 TABLET ORAL at 14:50

## 2025-07-23 RX ADMIN — GADOTERIDOL 15 ML: 279.3 INJECTION, SOLUTION INTRAVENOUS at 16:04

## 2025-07-23 RX ADMIN — SODIUM CHLORIDE, PRESERVATIVE FREE 10 ML: 5 INJECTION INTRAVENOUS at 08:36

## 2025-07-23 ASSESSMENT — ENCOUNTER SYMPTOMS
COUGH: 0
SORE THROAT: 0
DIARRHEA: 1
RHINORRHEA: 0
ABDOMINAL PAIN: 1
NAUSEA: 1
VOMITING: 0
SHORTNESS OF BREATH: 0

## 2025-07-23 ASSESSMENT — VISUAL ACUITY: VA_NORMAL: 1

## 2025-07-23 NOTE — PROGRESS NOTES
[facility]  Neurodiagnostic Laboratory Technician Report  STRZ ONC MED 5K  Routine, Long-Term Video Monitoring (LTVM)    Name: Jackeline Escobar  : 1975  Age: 49 y.o.  Sex: female  MRN: [id number]  CSN: [id number]    Ordering Provider: Caroline Benitez      EEG Number: [id number] LTVM 3 hour     Time In: Time In:  (25)  Time Out:  (2025)  Total Treatment Time: 3 hours 6 mins     LTVM hooked up running bedside - Time: 180    Clinical History: her routine 20 min eeg showed  30 minute EEG revealed frequent right anterior temporal spikes., 3 hour eeg to monitor for an episode. Patient event button given to patient     Past Medical History: No past medical history on file.    Medications:   Prior to Admission medications    Medication Sig Start Date End Date Taking? Authorizing Provider   buPROPion (WELLBUTRIN XL) 300 MG extended release tablet Take 1 tablet by mouth every morning 23  Yes Joe Gordon MD   Multiple Vitamin (MULTI-VITAMINS PO) Take by mouth   Yes ProviderKimi MD       Hand Dominance: Right   Sedation: No   H.V. Completed: No,LTVM   Photic: No   Sleep: Yes   Drowsy: LTVM   Sleep Deprived: LTVM   Seizures Observed: LTVM   Mentality: alert     Technician: Rachel Farah 2025

## 2025-07-23 NOTE — PROCEDURES
EEG REPORT       Patient: Jackeline Escobar Age: 49 y.o.  MRN: [id number]      Referring Provider: Yuliana Pennington APRN -*    History: This routine 30 minute scalp EEG was recorded with video- monitoring for a 49 y.o.. female  who presented with encephalopathy. This EEG was performed to evaluate for focal and epileptiform abnormalities.     Jackeline Escobar   Current Facility-Administered Medications   Medication Dose Route Frequency Provider Last Rate Last Admin    LORazepam (ATIVAN) tablet 1 mg  1 mg Oral Once Neri Frey MD        calcium carbonate (TUMS) chewable tablet 500 mg  500 mg Oral TID PRN Vanessa Grigsby,         sodium chloride flush 0.9 % injection 5-40 mL  5-40 mL IntraVENous 2 times per day Farhad Burrows MD   10 mL at 07/22/25 2026    sodium chloride flush 0.9 % injection 5-40 mL  5-40 mL IntraVENous PRN Farhad Burrows MD        0.9 % sodium chloride infusion   IntraVENous PRN Farhad Burrows MD        potassium chloride (KLOR-CON M) extended release tablet 40 mEq  40 mEq Oral PRN Farhad Burrows MD        Or    potassium bicarb-citric acid (EFFER-K) effervescent tablet 40 mEq  40 mEq Oral PRN Farhad Burrows MD        Or    potassium chloride 10 mEq/100 mL IVPB (Peripheral Line)  10 mEq IntraVENous PRN Farhad Burrows MD        magnesium sulfate 2000 mg in 50 mL IVPB premix  2,000 mg IntraVENous PRN Farhad Burrows MD        enoxaparin (LOVENOX) injection 40 mg  40 mg SubCUTAneous Daily Farhad Burrows MD   40 mg at 07/22/25 0903    ondansetron (ZOFRAN-ODT) disintegrating tablet 4 mg  4 mg Oral Q8H PRN Farhad Burrows MD        Or    ondansetron (ZOFRAN) injection 4 mg  4 mg IntraVENous Q6H PRN Farhad Burrows MD   4 mg at 07/22/25 0903    polyethylene glycol (GLYCOLAX) packet 17 g  17 g Oral Daily PRN Farhad Burrows MD        acetaminophen (TYLENOL) tablet 650 mg  650 mg Oral Q6H PRN Farhad Burrows MD        Or    acetaminophen (TYLENOL) suppository 650 mg  650 mg Rectal Q6H PRN Stormy,  regular rate and rhythm/no murmur

## 2025-07-23 NOTE — PROGRESS NOTES
Hospitalist Progress Note  Internal Medicine Resident      Patient: Jackeline Escobar 49 y.o. female      Unit/Bed: -15/015-A    Admit Date: 7/21/2025      ASSESSMENT AND PLAN  Active Problems  Seizure-like activity: Patient's fiancé reports patient was posturing foaming at the mouth and shaking violently on the morning of 7/21 while sleeping.  Patient not report any previous seizure activity.  Patient was taken to [place] ED where workup showed , CT head negative, other labs unremarkable including lactic acid and troponin.  Patient reports binge drinking and reports marijuana use and drinking 1 1 bottle the night before.  Patient has tried ketamine for anxiety but has stopped due to it being too expensive.  Brain MRI shows no evidence of acute infarct and is negative.  However increased signal intensity in the white matter likely representing ischemic changes.  Neurology following  Keppra D/C'd  Refrain from starting AEDs to avoid suppressing the brain further for EEG evaluation  No indication to start AED at this time  EEG on 7/22 revealed abnormality in wakefulness and sleep  Repeat EEG 3-hour session on 7/23, pending results  Follow-up outpatient with neurology  Trend , [phone], [phone], [phone]   Ethanol less than 0.01  Seizure precautions in place  Resolved Problems  Gastritis: Patient reports 2 days of diarrhea with about 6-7 bouts of diarrhea daily.  CT A/P is concerning for jejunitis.  Patient received 100 cc NS IVF during hospital stay.  Patient reports formed bowel today without diarrhea, nonbloody.    Chronic Conditions (reviewed and stable unless otherwise stated)  Anxiety: Patient stopped taking Wellbutrin due to hallucinations, started ketamine treatment but stopped due to being too expensive.  Currently on no medication  Bell's palsy: Patient reports Bell's palsy during previous pregnancy now resolved      LDA: []CVC / []PICC / []Midline / []Zuniga / []Drains / []Mediport / [x]None  Antibiotics:

## 2025-07-23 NOTE — PROGRESS NOTES
Neurology Progress Note    Date:7/23/2025       Room:[facility]15/015-A  Patient Name:Jackeline Escobar     YOB: 1975     Age:49 y.o.    Requesting Physician: Johan Vaca, DO     Reason for Consult:  Evaluate for ?? Of seizure      Chief Complaint: No chief complaint on file.      Subjective     Jackeline Escobar is a 49 y.o. female with a history of anxiety who presented to [facility] as a transfer from [place] for further evaluation of seizure like activity. Patient and significant other at bedside states that the night prior patient had drank roughly a bottle of wine. Patient notes that for the last 3-4 days she has been with diarrhea and not being able to keep anything down. Significant other reports that patient was lying down and had a sudden gasp of air then her extremities became rigid and patient begin to seize for roughly 3 minutes. He states at that time patient was also foaming at the mouth. He states that after patient began to have sonorous respirations and was confused for roughly 10 minutes afterwards. Patient was brought to [place] ER for further evaluation. Abdominal imaging obtained at [place] shows gastritis. Patient was given Keppra and transferred to [facility] for neurology consult. At time of evaluation, patient states she is doing relatively well. She denies any history of previous episode and denies having a seizure before. No tongue laceration noted on examination. Patient denies losing bowel or bladder function during episode. Ionized calcium noted to be low at 1.08. Urine drug screen positive for cannabinoids. MRI brain W WO contrast pending. EEG pending.     Interval History 7/23/2025:  No acute events overnight. 30 minute EEG yesterday revealed frequent right anterior temporal spike waves. Given this, planning to do a 3 hour EEG for further evaluation. Recommend against starting AEDs at this time to avoid suppressing the brain until 3 hour EEG obtained. MRI brain W WO contrast pending.

## 2025-07-24 VITALS
DIASTOLIC BLOOD PRESSURE: 92 MMHG | HEART RATE: 82 BPM | SYSTOLIC BLOOD PRESSURE: 164 MMHG | OXYGEN SATURATION: 98 % | TEMPERATURE: 98.3 F | RESPIRATION RATE: 16 BRPM | HEIGHT: 61 IN | BODY MASS INDEX: 29.27 KG/M2 | WEIGHT: 155 LBS

## 2025-07-24 PROBLEM — R56.9 NEW ONSET SEIZURE (HCC): Status: ACTIVE | Noted: 2025-07-24

## 2025-07-24 PROCEDURE — 2500000003 HC RX 250 WO HCPCS

## 2025-07-24 PROCEDURE — 1200000000 HC SEMI PRIVATE

## 2025-07-24 PROCEDURE — G0378 HOSPITAL OBSERVATION PER HR: HCPCS

## 2025-07-24 PROCEDURE — 6360000002 HC RX W HCPCS

## 2025-07-24 RX ORDER — BUSPIRONE HYDROCHLORIDE 5 MG/1
5 TABLET ORAL 2 TIMES DAILY
Qty: 60 TABLET | Refills: 0 | Status: SHIPPED | OUTPATIENT
Start: 2025-07-24 | End: 2025-08-23

## 2025-07-24 RX ADMIN — ENOXAPARIN SODIUM 40 MG: 100 INJECTION SUBCUTANEOUS at 08:50

## 2025-07-24 RX ADMIN — SODIUM CHLORIDE, PRESERVATIVE FREE 10 ML: 5 INJECTION INTRAVENOUS at 08:50

## 2025-07-24 ASSESSMENT — ENCOUNTER SYMPTOMS
RHINORRHEA: 0
SORE THROAT: 0
ABDOMINAL PAIN: 1
SHORTNESS OF BREATH: 0
VOMITING: 0
COUGH: 0
NAUSEA: 1
DIARRHEA: 1

## 2025-07-24 ASSESSMENT — VISUAL ACUITY: VA_NORMAL: 1

## 2025-07-24 NOTE — PROGRESS NOTES
Neurology Progress Note    Date:7/24/2025       Room:[facility]15/015-A  Patient Name:Jackeline Escobar     YOB: 1975     Age:49 y.o.    Requesting Physician: Johan Vaca, DO     Reason for Consult:  Evaluate for ?? Of seizure      Chief Complaint: No chief complaint on file.      Subjective     Jackeline Escobar is a 49 y.o. female with a history of anxiety who presented to [facility] as a transfer from [place] for further evaluation of seizure like activity. Patient and significant other at bedside states that the night prior patient had drank roughly a bottle of wine. Patient notes that for the last 3-4 days she has been with diarrhea and not being able to keep anything down. Significant other reports that patient was lying down and had a sudden gasp of air then her extremities became rigid and patient begin to seize for roughly 3 minutes. He states at that time patient was also foaming at the mouth. He states that after patient began to have sonorous respirations and was confused for roughly 10 minutes afterwards. Patient was brought to [place] ER for further evaluation. Abdominal imaging obtained at [place] shows gastritis. Patient was given Keppra and transferred to [facility] for neurology consult. At time of evaluation, patient states she is doing relatively well. She denies any history of previous episode and denies having a seizure before. No tongue laceration noted on examination. Patient denies losing bowel or bladder function during episode. Ionized calcium noted to be low at 1.08. Urine drug screen positive for cannabinoids. MRI brain W WO contrast pending. EEG pending.     Interval History 7/23/2025:  No acute events overnight. 30 minute EEG yesterday revealed frequent right anterior temporal spike waves. Given this, planning to do a 3 hour EEG for further evaluation. Recommend against starting AEDs at this time to avoid suppressing the brain until 3 hour EEG obtained. MRI brain W WO contrast pending.

## 2025-07-24 NOTE — CARE COORDINATION
7/24/25, 1:46 PM EDT    Patient goals/plan/ treatment preferences discussed by  and .  Patient goals/plan/ treatment preferences reviewed with patient/ family.  Patient/ family verbalize understanding of discharge plan and are in agreement with goal/plan/treatment preferences.  Understanding was demonstrated using the teach back method.  AVS provided by RN at time of discharge, which includes all necessary medical information pertaining to the patients current course of illness, treatment, post-discharge goals of care, and treatment preferences.     Services At/After Discharge: None      This CM called [facility] in [place]. Office has not yet received New Patient Application. Refaxed completed application to PCP office. Per Cayla from [facility], patient will receive a call for a new patient appt once they receive application and review. Patient notified of this information.

## 2025-07-24 NOTE — DISCHARGE SUMMARY
Studies    Radiology:   MRI BRAIN W WO CONTRAST   Final Result      1. Punctate areas of increased signal intensity in the white matter most likely   representing ischemic changes. No evidence of an acute infarct.   2. Otherwise negative MRI scan of the brain with and without intravenous   contrast.    .               **This report has been created using voice recognition software. It may contain   minor errors which are inherent in voice recognition technology.**               Electronically signed by Dr. Chrissy Jeong           Consults:   IP CONSULT TO NEUROLOGY    Disposition: Home  Condition at Discharge: Stable    Code Status:  Prior     Patient Instructions:    Discharge lab work: None  Activity: activity as tolerated  Diet: No diet orders on file      Follow-up visits:   Raad Mcconnell MD  [address] [facility] [address]  [facility] [id number] [phone]    Call           Discharge Medications:      Medication List        PAUSE taking these medications      buPROPion 300 MG extended release tablet  Wait to take this until your doctor or other care provider tells you to start again.  Plan to follow-up with neurology, neurology will determine whether you can restart bupropion  My concern with this medication as it may reduce seizure threshold.  I started you on buspirone 5 mg twice a day to help with anxiety.  Commonly known as: Wellbutrin XL  Take 1 tablet by mouth every morning            START taking these medications      busPIRone 5 MG tablet  Commonly known as: BUSPAR  Take 1 tablet by mouth 2 times daily            CONTINUE taking these medications      MULTI-VITAMINS PO               Where to Get Your Medications        These medications were sent to [id number] - [place], [place] - [address] - P [phone] - F [phone] [address] [facility] [address]      Phone: [phone]   busPIRone 5 MG tablet                Time Spent on discharge is 35 minutes in the examination, evaluation,

## 2025-07-24 NOTE — PLAN OF CARE
Problem: Seizure Precautions  Goal: Remains free of injury related to seizures activity  7/22/2025 1150 by João Chew RN  Outcome: Progressing  Flowsheets (Taken 7/22/2025 1150)  Remains free of injury related to seizure activity:   Monitor patient for seizure activity, document and report duration and description of seizure to Licensed Independent Practitioner   Instruct patient/family to notify RN of any seizure activity     Problem: Pain  Goal: Verbalizes/displays adequate comfort level or baseline comfort level  7/22/2025 1150 by João Chew RN  Outcome: Progressing  Flowsheets (Taken 7/22/2025 1150)  Verbalizes/displays adequate comfort level or baseline comfort level:   Encourage patient to monitor pain and request assistance   Assess pain using appropriate pain scale   Implement non-pharmacological measures as appropriate and evaluate response   Consider cultural and social influences on pain and pain management     Problem: Discharge Planning  Goal: Discharge to home or other facility with appropriate resources  7/22/2025 1150 by João Chew RN  Outcome: Progressing  Flowsheets (Taken 7/22/2025 1150)  Discharge to home or other facility with appropriate resources: Identify barriers to discharge with patient and caregiver     Problem: Skin/Tissue Integrity  Goal: Skin integrity remains intact  Description: 1.  Monitor for areas of redness and/or skin breakdown  2.  Assess vascular access sites hourly  3.  Every 4-6 hours minimum:  Change oxygen saturation probe site  4.  Every 4-6 hours:  If on nasal continuous positive airway pressure, respiratory therapy assess nares and determine need for appliance change or resting period  7/22/2025 1150 by João Chew RN  Outcome: Progressing  Flowsheets (Taken 7/22/2025 1150)  Skin Integrity Remains Intact: Check visual cues for pain     Problem: Safety - Adult  Goal: Free from fall injury  7/22/2025 1150 by Naina 
  Problem: Seizure Precautions  Goal: Remains free of injury related to seizures activity  7/23/2025 1038 by Carlos Perry, RN  Outcome: Progressing  Flowsheets  Taken 7/23/2025 1000  Remains free of injury related to seizure activity:   Maintain airway, patient safety  and administer oxygen as ordered   Monitor patient for seizure activity, document and report duration and description of seizure to Licensed Independent Practitioner   If seizure occurs, turn patient to side and suction secretions as needed   Reorient patient post seizure   Seizure pads on all 4 side rails   Instruct patient/family to notify RN of any seizure activity   Instruct patient/family to call for assistance with activity based on assessment  Taken 7/23/2025 0900  Remains free of injury related to seizure activity:   Maintain airway, patient safety  and administer oxygen as ordered   Monitor patient for seizure activity, document and report duration and description of seizure to Licensed Independent Practitioner   If seizure occurs, turn patient to side and suction secretions as needed   Reorient patient post seizure   Seizure pads on all 4 side rails   Instruct patient/family to notify RN of any seizure activity   Instruct patient/family to call for assistance with activity based on assessment  Taken 7/23/2025 0800  Remains free of injury related to seizure activity:   Maintain airway, patient safety  and administer oxygen as ordered   Monitor patient for seizure activity, document and report duration and description of seizure to Licensed Independent Practitioner   Reorient patient post seizure   If seizure occurs, turn patient to side and suction secretions as needed   Seizure pads on all 4 side rails   Instruct patient/family to notify RN of any seizure activity   Instruct patient/family to call for assistance with activity based on assessment     Problem: Pain  Goal: Verbalizes/displays adequate comfort level or baseline comfort 
  Problem: Seizure Precautions  Goal: Remains free of injury related to seizures activity  Outcome: Progressing     Problem: Pain  Goal: Verbalizes/displays adequate comfort level or baseline comfort level  Outcome: Progressing  Flowsheets (Taken 7/21/2025 2215)  Verbalizes/displays adequate comfort level or baseline comfort level:   Encourage patient to monitor pain and request assistance   Assess pain using appropriate pain scale   Administer analgesics based on type and severity of pain and evaluate response   Implement non-pharmacological measures as appropriate and evaluate response     Problem: Discharge Planning  Goal: Discharge to home or other facility with appropriate resources  Outcome: Progressing  Flowsheets (Taken 7/21/2025 2215)  Discharge to home or other facility with appropriate resources: Identify barriers to discharge with patient and caregiver     Problem: Skin/Tissue Integrity  Goal: Skin integrity remains intact  Description: 1.  Monitor for areas of redness and/or skin breakdown  2.  Assess vascular access sites hourly  3.  Every 4-6 hours minimum:  Change oxygen saturation probe site  4.  Every 4-6 hours:  If on nasal continuous positive airway pressure, respiratory therapy assess nares and determine need for appliance change or resting period  Outcome: Progressing  Flowsheets (Taken 7/21/2025 2215)  Skin Integrity Remains Intact:   Monitor for areas of redness and/or skin breakdown   Assess vascular access sites hourly     Problem: Safety - Adult  Goal: Free from fall injury  Outcome: Progressing   Fall assessment completed. Patient using call light appropriately to call for assistance with ambulation to bathroom.  Personal items within reach. Patient is also compliant with use of non-skid slippers.  Placed bed in low position. Bed alarm turned on    Problem: Gastrointestinal - Adult  Goal: Minimal or absence of nausea and vomiting  Outcome: Progressing  Flowsheets (Taken 7/21/2025 
  Problem: Seizure Precautions  Goal: Remains free of injury related to seizures activity  Outcome: Progressing  Flowsheets  Taken 7/24/2025 0038 by Natalya Jones RN  Remains free of injury related to seizure activity:   Maintain airway, patient safety  and administer oxygen as ordered   Monitor patient for seizure activity, document and report duration and description of seizure to Licensed Independent Practitioner   Reorient patient post seizure   If seizure occurs, turn patient to side and suction secretions as needed   Seizure pads on all 4 side rails   Instruct patient/family to notify RN of any seizure activity   Instruct patient/family to call for assistance with activity based on assessment  Taken 7/23/2025 1800 by Carlos Perry RN  Remains free of injury related to seizure activity:   Maintain airway, patient safety  and administer oxygen as ordered   Monitor patient for seizure activity, document and report duration and description of seizure to Licensed Independent Practitioner   If seizure occurs, turn patient to side and suction secretions as needed   Reorient patient post seizure   Seizure pads on all 4 side rails   Instruct patient/family to notify RN of any seizure activity   Instruct patient/family to call for assistance with activity based on assessment  Taken 7/23/2025 1700 by Carlos Perry RN  Remains free of injury related to seizure activity:   Maintain airway, patient safety  and administer oxygen as ordered   Monitor patient for seizure activity, document and report duration and description of seizure to Licensed Independent Practitioner   If seizure occurs, turn patient to side and suction secretions as needed   Reorient patient post seizure   Seizure pads on all 4 side rails   Instruct patient/family to notify RN of any seizure activity   Instruct patient/family to call for assistance with activity based on assessment  Taken 7/23/2025 1600 by Carlos Perry RN  Remains free of injury 
Intact:   Monitor for areas of redness and/or skin breakdown   Assess vascular access sites hourly     Problem: Safety - Adult  Goal: Free from fall injury  7/22/2025 2114 by Fadumo Schaefer RN  Outcome: Progressing     Problem: Gastrointestinal - Adult  Goal: Minimal or absence of nausea and vomiting  7/22/2025 2114 by Fadumo Schaefer RN  Outcome: Progressing     Problem: Gastrointestinal - Adult  Goal: Maintains or returns to baseline bowel function  7/22/2025 2114 by Fadumo Schaefer RN  Outcome: Progressing     Problem: Infection - Adult  Goal: Absence of infection during hospitalization  7/22/2025 2114 by Fadumo Schaefer RN  Outcome: Progressing     Problem: Metabolic/Fluid and Electrolytes - Adult  Goal: Electrolytes maintained within normal limits  7/22/2025 2114 by Fadumo Schaefer RN  Outcome: Progressing    Care plan reviewed with patient.  Patient verbalize understanding of the plan of care and contribute to goal setting.         
patient to side and suction secretions as needed   Reorient patient post seizure   Seizure pads on all 4 side rails   Instruct patient/family to notify RN of any seizure activity   Instruct patient/family to call for assistance with activity based on assessment     Problem: Pain  Goal: Verbalizes/displays adequate comfort level or baseline comfort level  7/24/2025 1148 by Nunu Coronel RN  Outcome: Progressing  Flowsheets (Taken 7/24/2025 0038 by Natalya Jones RN)  Verbalizes/displays adequate comfort level or baseline comfort level:   Encourage patient to monitor pain and request assistance   Assess pain using appropriate pain scale   Administer analgesics based on type and severity of pain and evaluate response   Implement non-pharmacological measures as appropriate and evaluate response   Consider cultural and social influences on pain and pain management   Notify Licensed Independent Practitioner if interventions unsuccessful or patient reports new pain  7/24/2025 0038 by Natalya Jones RN  Outcome: Progressing  Flowsheets (Taken 7/24/2025 0038)  Verbalizes/displays adequate comfort level or baseline comfort level:   Encourage patient to monitor pain and request assistance   Assess pain using appropriate pain scale   Administer analgesics based on type and severity of pain and evaluate response   Implement non-pharmacological measures as appropriate and evaluate response   Consider cultural and social influences on pain and pain management   Notify Licensed Independent Practitioner if interventions unsuccessful or patient reports new pain     Problem: Discharge Planning  Goal: Discharge to home or other facility with appropriate resources  7/24/2025 1148 by Nunu Coronel RN  Outcome: Progressing  Flowsheets (Taken 7/24/2025 0038 by Natalya Jones RN)  Discharge to home or other facility with appropriate resources:   Identify barriers to discharge with patient and caregiver   Arrange for needed

## 2025-07-24 NOTE — PROCEDURES
LONG-TERM EEG-VIDEO MONITORING   CLINICAL NEUROPHYSIOLOGY LABORATORY  DEPARTMENT OF NEUROLOGY  [facility]    Patient: Jackeline Escobar  Age: 49 y.o.  MRN: [id number]    Referring Physician: Yuliana Pennington APRN -*  History: The patient is a 49 y.o. female who presented breakthrough seizure/encephalopathy. This long-term video-EEG monitoring study was performed to determine the nature of the patient's clinical events. The patient is on neuroactive medications.   Jackeline Escobar   Current Facility-Administered Medications   Medication Dose Route Frequency Provider Last Rate Last Admin    calcium carbonate (TUMS) chewable tablet 500 mg  500 mg Oral TID PRN Seb, Martínezose, DO        sodium chloride flush 0.9 % injection 5-40 mL  5-40 mL IntraVENous 2 times per day Farhad Burrows MD   10 mL at 07/23/25 2120    sodium chloride flush 0.9 % injection 5-40 mL  5-40 mL IntraVENous PRN Farhad Burrows MD        0.9 % sodium chloride infusion   IntraVENous PRN Farhad Burrows MD        potassium chloride (KLOR-CON M) extended release tablet 40 mEq  40 mEq Oral PRN Farhad Burrows MD        Or    potassium bicarb-citric acid (EFFER-K) effervescent tablet 40 mEq  40 mEq Oral PRN Farhad Burrows MD        Or    potassium chloride 10 mEq/100 mL IVPB (Peripheral Line)  10 mEq IntraVENous PRN Farhad Burrows MD        magnesium sulfate 2000 mg in 50 mL IVPB premix  2,000 mg IntraVENous PRN Farhad Burrows MD        enoxaparin (LOVENOX) injection 40 mg  40 mg SubCUTAneous Daily Farhad Burrows MD   40 mg at 07/23/25 0836    ondansetron (ZOFRAN-ODT) disintegrating tablet 4 mg  4 mg Oral Q8H PRN Farhad Burrows MD        Or    ondansetron (ZOFRAN) injection 4 mg  4 mg IntraVENous Q6H PRN Farhad Burrows MD   4 mg at 07/22/25 0903    polyethylene glycol (GLYCOLAX) packet 17 g  17 g Oral Daily PRN Farhad Burrows MD        acetaminophen (TYLENOL) tablet 650 mg  650 mg Oral Q6H PRN Farhad Burrows MD        Or

## 2025-07-24 NOTE — DISCHARGE INSTRUCTIONS
Please continue application process for establishing yourself with a PCP in [place]  Please call neurology office to schedule follow-up visit    Please do not take Wellbutrin 300 mg, this medication may reduce seizure threshold  Please start with Buspar 5 mg twice a day to help with anxiety for now  On visit with neurology, request if you can restart Wellbutrin, I understand that it is expensive    No driving, operating heavy machinery, or submerging in water for 6 months or until cleared by outpatient neurology  Alcohol withdrawal and cannabis both also lower the seizure threshold, I would recommend cessation